# Patient Record
Sex: FEMALE | Race: WHITE | HISPANIC OR LATINO | Employment: OTHER | URBAN - METROPOLITAN AREA
[De-identification: names, ages, dates, MRNs, and addresses within clinical notes are randomized per-mention and may not be internally consistent; named-entity substitution may affect disease eponyms.]

---

## 2018-05-04 NOTE — PATIENT DISCUSSION
PATIENT UNDERSTANDS THAT THERE IS AN INCREASED RISK TO THE RETINA DUE TO HIGH MYOPIA AND THAT RETINAL CLEARANCE WILL BE NEEDED PRIOR TO SURGERY.

## 2018-05-04 NOTE — PATIENT DISCUSSION
PATIENT IS A GOOD CANDIDATE FOR [address] OR RLE. RECOMMEND RLE BECAUSE USING [facility] CLS NOW AND HAS WESLEY AND BLEPHARITIS.

## 2021-03-15 NOTE — PATIENT DISCUSSION
The risks, benefits and alternatives of refractive surgery have been  discussed to include possible decrease in vision, dry eye, and halos/starbursts around lights.

## 2022-03-17 NOTE — PATIENT DISCUSSION
Pachs are 574, 550 for IOP increase of +2mmHg OD, +1mmHg OS.  Pachs were taken at a previous exam but I included it in this chart so that we can see it in this EHR.

## 2022-03-17 NOTE — PATIENT DISCUSSION
Patient admits poor compliance with her medication.  Sampled 3 bottles of lumigan 3/2021 and never filled an rx for it.  She says she uses it 4-5 times/week and I told her that one 2.5 ml bottle should last her about a month.  Be that as it may I sampled her 3 more bottles and told her we'll transition her to latanoprost QHS OU when these run out so cost is not a barrier to compliance.  Progression analysis has been stable since initial scan in 2019.  I advised her that she's already got thin RNFL OD, OS and she's only 51yoa so even small changes over the course of decades could be problematic long term.  RTC earliest convenience for 24-2 VF and I told her to let me know when she needs a new rx for latanoprost.

## 2022-06-03 NOTE — PATIENT DISCUSSION
Patient admits off and on compliance with her medication.  For example I sampled 3 bottles of lumigan 3/2021 and she never filled an rx for it prior to 3/2022 PC.  She says she uses it 4-5 times/week and I told her that one 2.5 ml bottle should last her about a month.  Be that as it may I sampled her 3 more bottles at 3/2022 [facility] and told her we'll transition her to latanoprost QHS OU when these run out so cost is not a barrier to compliance.  Progression analysis has been stable since initial scan in 2019.  I advised her that she's already got thin RNFL OD, OS and she's only 51yoa so even small changes over the course of decades could be problematic long term.  24-2 VF continues to be excellent OD, OS.  RTC 3/2023 for PC and I told her to let me know when she needs a new rx for latanoprost.

## 2022-08-17 ENCOUNTER — NEW PATIENT (OUTPATIENT)
Dept: URBAN - METROPOLITAN AREA CLINIC 35 | Facility: CLINIC | Age: 67
End: 2022-08-17

## 2022-08-17 DIAGNOSIS — H52.03: ICD-10-CM

## 2022-08-17 PROCEDURE — 92015 DETERMINE REFRACTIVE STATE: CPT

## 2022-08-17 PROCEDURE — 92004 COMPRE OPH EXAM NEW PT 1/>: CPT

## 2022-08-17 ASSESSMENT — VISUAL ACUITY
OD_SC: 20/25
OD_SC: J10
OU_CC: J2
OS_SC: 20/70+1
OU_CC: 20/25+2
OU_SC: J8
OD_CC: 20/25
OS_CC: J2
OS_CC: 20/25
OD_CC: J2
OU_SC: 20/25

## 2022-08-17 ASSESSMENT — TONOMETRY
OS_IOP_MMHG: 18
OD_IOP_MMHG: 18

## 2022-11-30 NOTE — PATIENT DISCUSSION
In 2018 patient was rx'd doxy and lotemax due to flareup.  Discussed the fact she has blepharitis, uses EW ctls, she's in her 46s so she's drier than she used to be and it's November 30th so the air is dry.  Shes used to do lid scrubs for maintenance purposes but has d/c'd that.  Discussed option of resuming lid scrubs and supplement with ATs, transitioning to daily ctls, and/or initiating xiidra since she responded to lotemax.  Going to start with lid maintenance and ATs and reserve the right to go a more aggressive route going forward.

## 2022-11-30 NOTE — PATIENT DISCUSSION
Patient admits off and on compliance with her medication.  For example I sampled 3 bottles of lumigan 3/2021 and she never filled an rx for it prior to 3/2022 PC.  She says she uses it 4-5 times/week and I told her that one 2.5 ml bottle should last her about a month.  Be that as it may I sampled her 3 more bottles at 3/2022 [facility] and told her we'll transition her to latanoprost QHS OU when these run out so cost is not a barrier to compliance.  Progression analysis has been stable since initial scan in 2019.  I advised her that she's already got thin RNFL OD, OS and she's only 51yoa so even small changes over the course of decades could be problematic long term.  24-2 VF continues to be excellent OD, OS.  RTC 3/2023 for PC and I told her to let me know when she needs a new rx for latanoprost.
